# Patient Record
Sex: MALE | Race: WHITE | URBAN - METROPOLITAN AREA
[De-identification: names, ages, dates, MRNs, and addresses within clinical notes are randomized per-mention and may not be internally consistent; named-entity substitution may affect disease eponyms.]

---

## 2018-01-01 ENCOUNTER — OFFICE VISIT (OUTPATIENT)
Dept: FAMILY MEDICINE | Facility: CLINIC | Age: 83
End: 2018-01-01
Payer: COMMERCIAL

## 2018-01-01 ENCOUNTER — NURSING HOME VISIT (OUTPATIENT)
Dept: GERIATRICS | Facility: CLINIC | Age: 83
End: 2018-01-01
Payer: COMMERCIAL

## 2018-01-01 ENCOUNTER — NURSING HOME VISIT (OUTPATIENT)
Dept: GERIATRICS | Facility: CLINIC | Age: 83
End: 2018-01-01
Payer: MEDICARE

## 2018-01-01 ENCOUNTER — DISCHARGE SUMMARY NURSING HOME (OUTPATIENT)
Dept: GERIATRICS | Facility: CLINIC | Age: 83
End: 2018-01-01
Payer: COMMERCIAL

## 2018-01-01 ENCOUNTER — MEDICAL CORRESPONDENCE (OUTPATIENT)
Dept: HEALTH INFORMATION MANAGEMENT | Facility: CLINIC | Age: 83
End: 2018-01-01

## 2018-01-01 ENCOUNTER — TRANSFERRED RECORDS (OUTPATIENT)
Dept: HEALTH INFORMATION MANAGEMENT | Facility: CLINIC | Age: 83
End: 2018-01-01

## 2018-01-01 VITALS
TEMPERATURE: 97.9 F | HEIGHT: 66 IN | WEIGHT: 178 LBS | HEART RATE: 76 BPM | DIASTOLIC BLOOD PRESSURE: 71 MMHG | OXYGEN SATURATION: 90 % | SYSTOLIC BLOOD PRESSURE: 128 MMHG | RESPIRATION RATE: 15 BRPM | BODY MASS INDEX: 28.61 KG/M2

## 2018-01-01 VITALS
RESPIRATION RATE: 18 BRPM | WEIGHT: 185.7 LBS | OXYGEN SATURATION: 95 % | TEMPERATURE: 98.5 F | SYSTOLIC BLOOD PRESSURE: 126 MMHG | BODY MASS INDEX: 30.9 KG/M2 | DIASTOLIC BLOOD PRESSURE: 76 MMHG | HEART RATE: 66 BPM

## 2018-01-01 VITALS
RESPIRATION RATE: 18 BRPM | OXYGEN SATURATION: 92 % | DIASTOLIC BLOOD PRESSURE: 60 MMHG | HEIGHT: 67 IN | TEMPERATURE: 98.4 F | SYSTOLIC BLOOD PRESSURE: 100 MMHG | BODY MASS INDEX: 28.88 KG/M2 | HEART RATE: 78 BPM | WEIGHT: 184 LBS

## 2018-01-01 VITALS
WEIGHT: 198 LBS | DIASTOLIC BLOOD PRESSURE: 92 MMHG | HEIGHT: 65 IN | OXYGEN SATURATION: 96 % | RESPIRATION RATE: 20 BRPM | BODY MASS INDEX: 32.99 KG/M2 | SYSTOLIC BLOOD PRESSURE: 158 MMHG | HEART RATE: 64 BPM | TEMPERATURE: 97.5 F

## 2018-01-01 VITALS
DIASTOLIC BLOOD PRESSURE: 62 MMHG | OXYGEN SATURATION: 95 % | WEIGHT: 197 LBS | SYSTOLIC BLOOD PRESSURE: 120 MMHG | TEMPERATURE: 97.8 F | HEIGHT: 65 IN | HEART RATE: 73 BPM | RESPIRATION RATE: 20 BRPM | BODY MASS INDEX: 32.82 KG/M2

## 2018-01-01 VITALS
DIASTOLIC BLOOD PRESSURE: 66 MMHG | TEMPERATURE: 98.4 F | SYSTOLIC BLOOD PRESSURE: 115 MMHG | BODY MASS INDEX: 30.74 KG/M2 | RESPIRATION RATE: 18 BRPM | HEART RATE: 74 BPM | WEIGHT: 184.7 LBS | OXYGEN SATURATION: 90 %

## 2018-01-01 DIAGNOSIS — K59.03 DRUG-INDUCED CONSTIPATION: ICD-10-CM

## 2018-01-01 DIAGNOSIS — I10 ESSENTIAL HYPERTENSION: ICD-10-CM

## 2018-01-01 DIAGNOSIS — Z51.5 HOSPICE CARE PATIENT: ICD-10-CM

## 2018-01-01 DIAGNOSIS — C78.7 SECONDARY MALIGNANT NEOPLASM OF LIVER (H): ICD-10-CM

## 2018-01-01 DIAGNOSIS — C34.90 PRIMARY MALIGNANT NEOPLASM OF LUNG METASTATIC TO OTHER SITE, UNSPECIFIED LATERALITY (H): Primary | ICD-10-CM

## 2018-01-01 DIAGNOSIS — R25.1 TREMOR: ICD-10-CM

## 2018-01-01 DIAGNOSIS — Z23 NEED FOR PNEUMOCOCCAL VACCINATION: ICD-10-CM

## 2018-01-01 DIAGNOSIS — I25.10 CORONARY ARTERY DISEASE INVOLVING NATIVE CORONARY ARTERY OF NATIVE HEART WITHOUT ANGINA PECTORIS: ICD-10-CM

## 2018-01-01 DIAGNOSIS — R05.9 COUGH: ICD-10-CM

## 2018-01-01 DIAGNOSIS — G47.00 INSOMNIA, UNSPECIFIED TYPE: ICD-10-CM

## 2018-01-01 DIAGNOSIS — R11.0 NAUSEA: ICD-10-CM

## 2018-01-01 DIAGNOSIS — I10 HYPERTENSION GOAL BP (BLOOD PRESSURE) < 140/80: ICD-10-CM

## 2018-01-01 DIAGNOSIS — E78.5 HYPERLIPIDEMIA LDL GOAL <70: ICD-10-CM

## 2018-01-01 DIAGNOSIS — C34.91 SMALL CELL CARCINOMA OF RIGHT LUNG (H): Primary | ICD-10-CM

## 2018-01-01 DIAGNOSIS — I10 HYPERTENSION GOAL BP (BLOOD PRESSURE) < 140/80: Primary | ICD-10-CM

## 2018-01-01 DIAGNOSIS — I25.10 CORONARY ARTERY DISEASE INVOLVING NATIVE CORONARY ARTERY OF NATIVE HEART WITHOUT ANGINA PECTORIS: Primary | ICD-10-CM

## 2018-01-01 DIAGNOSIS — H91.93 BILATERAL HEARING LOSS, UNSPECIFIED HEARING LOSS TYPE: ICD-10-CM

## 2018-01-01 LAB
ALBUMIN SERPL-MCNC: 3.8 G/DL (ref 3.4–5)
ALP SERPL-CCNC: 81 U/L (ref 40–150)
ALT SERPL W P-5'-P-CCNC: 34 U/L (ref 0–70)
ALT SERPL-CCNC: 28 IU/L (ref 8–45)
ANION GAP SERPL CALCULATED.3IONS-SCNC: 7 MMOL/L (ref 3–14)
AST SERPL W P-5'-P-CCNC: 31 U/L (ref 0–45)
AST SERPL-CCNC: 29 IU/L (ref 2–40)
BILIRUB SERPL-MCNC: 0.8 MG/DL (ref 0.2–1.3)
BUN SERPL-MCNC: 22 MG/DL (ref 7–30)
CALCIUM SERPL-MCNC: 9.3 MG/DL (ref 8.5–10.1)
CHLORIDE SERPL-SCNC: 104 MMOL/L (ref 94–109)
CO2 SERPL-SCNC: 28 MMOL/L (ref 20–32)
CREAT SERPL-MCNC: 0.82 MG/DL (ref 0.72–1.25)
CREAT SERPL-MCNC: 0.82 MG/DL (ref 0.72–1.25)
CREAT SERPL-MCNC: 0.92 MG/DL (ref 0.66–1.25)
GFR SERPL CREATININE-BSD FRML MDRD: 79 ML/MIN/1.7M2
GFR SERPL CREATININE-BSD FRML MDRD: >60 ML/MIN/1.73M2
GFR SERPL CREATININE-BSD FRML MDRD: >60 ML/MIN/1.73M2
GLUCOSE SERPL-MCNC: 92 MG/DL (ref 65–100)
GLUCOSE SERPL-MCNC: 93 MG/DL (ref 70–99)
GLUCOSE SERPL-MCNC: 94 MG/DL (ref 65–100)
INR PPP: 1.2
LDLC SERPL DIRECT ASSAY-MCNC: 51 MG/DL
POTASSIUM SERPL-SCNC: 4.1 MMOL/L (ref 3.5–5)
POTASSIUM SERPL-SCNC: 4.2 MMOL/L (ref 3.4–5.3)
POTASSIUM SERPL-SCNC: 4.2 MMOL/L (ref 3.5–5)
PROT SERPL-MCNC: 7.6 G/DL (ref 6.8–8.8)
SODIUM SERPL-SCNC: 139 MMOL/L (ref 133–144)

## 2018-01-01 PROCEDURE — 83721 ASSAY OF BLOOD LIPOPROTEIN: CPT | Performed by: INTERNAL MEDICINE

## 2018-01-01 PROCEDURE — 90670 PCV13 VACCINE IM: CPT | Performed by: INTERNAL MEDICINE

## 2018-01-01 PROCEDURE — 99316 NF DSCHRG MGMT 30 MIN+: CPT | Performed by: NURSE PRACTITIONER

## 2018-01-01 PROCEDURE — 99309 SBSQ NF CARE MODERATE MDM 30: CPT | Mod: GW | Performed by: NURSE PRACTITIONER

## 2018-01-01 PROCEDURE — 99213 OFFICE O/P EST LOW 20 MIN: CPT | Performed by: INTERNAL MEDICINE

## 2018-01-01 PROCEDURE — 80053 COMPREHEN METABOLIC PANEL: CPT | Performed by: INTERNAL MEDICINE

## 2018-01-01 PROCEDURE — G0009 ADMIN PNEUMOCOCCAL VACCINE: HCPCS | Performed by: INTERNAL MEDICINE

## 2018-01-01 PROCEDURE — 99306 1ST NF CARE HIGH MDM 50: CPT | Performed by: INTERNAL MEDICINE

## 2018-01-01 PROCEDURE — 99214 OFFICE O/P EST MOD 30 MIN: CPT | Performed by: INTERNAL MEDICINE

## 2018-01-01 PROCEDURE — 36415 COLL VENOUS BLD VENIPUNCTURE: CPT | Performed by: INTERNAL MEDICINE

## 2018-01-01 PROCEDURE — 99310 SBSQ NF CARE HIGH MDM 45: CPT | Performed by: NURSE PRACTITIONER

## 2018-01-01 RX ORDER — AMLODIPINE AND BENAZEPRIL HYDROCHLORIDE 5; 10 MG/1; MG/1
1 CAPSULE ORAL DAILY
Qty: 90 CAPSULE | Refills: 3 | Status: SHIPPED | OUTPATIENT
Start: 2018-01-01 | End: 2018-01-01

## 2018-01-01 RX ORDER — ALBUTEROL SULFATE 0.83 MG/ML
2.5 SOLUTION RESPIRATORY (INHALATION) 3 TIMES DAILY
COMMUNITY

## 2018-01-01 RX ORDER — CALCIUM CARBONATE 500 MG/1
2 TABLET, CHEWABLE ORAL EVERY 6 HOURS PRN
COMMUNITY

## 2018-01-01 RX ORDER — ATORVASTATIN CALCIUM 40 MG/1
40 TABLET, FILM COATED ORAL DAILY
Qty: 90 TABLET | Refills: 3 | Status: SHIPPED | OUTPATIENT
Start: 2018-01-01 | End: 2018-01-01 | Stop reason: ALTCHOICE

## 2018-01-01 RX ORDER — LANOLIN ALCOHOL/MO/W.PET/CERES
3 CREAM (GRAM) TOPICAL AT BEDTIME
Start: 2018-01-01

## 2018-01-01 RX ORDER — BISACODYL 10 MG
10 SUPPOSITORY, RECTAL RECTAL DAILY PRN
COMMUNITY

## 2018-01-01 RX ORDER — POLYETHYLENE GLYCOL 3350 17 G/17G
17 POWDER, FOR SOLUTION ORAL DAILY
COMMUNITY

## 2018-01-01 RX ORDER — AMOXICILLIN 250 MG
2 CAPSULE ORAL DAILY
COMMUNITY
End: 2018-01-01

## 2018-01-26 PROBLEM — R47.1 DYSARTHRIA: Status: ACTIVE | Noted: 2018-01-01

## 2018-01-29 PROBLEM — I25.10 CORONARY ARTERY DISEASE INVOLVING NATIVE CORONARY ARTERY OF NATIVE HEART WITHOUT ANGINA PECTORIS: Status: ACTIVE | Noted: 2018-01-01

## 2018-01-29 PROBLEM — R25.1 TREMOR: Status: ACTIVE | Noted: 2018-01-01

## 2018-01-29 NOTE — PATIENT INSTRUCTIONS
You should start taking atorvastatin 40 mg per day, and amlodipine/benazepril per day.                 These medications will cut down your risk of stroke and heart attack.                          Please follow up in 3 months.                   Keep taking the aspirin 81 mg per day.

## 2018-01-29 NOTE — PROGRESS NOTES
"  SUBJECTIVE:   Julito Cooper is a 82 year old male who presents to clinic today for the following health issues:      ED/UC Followup:    Facility:  [facility]  Date of visit: 1/22/2018  Reason for visit: speech disturbance  Current Status: stable, still having trouble, and also discuss \"tremors\" recently?       This is from the recent [facility] ER visit:     \"The patient reports that he has been having trouble talking/slurred speech for the past 3 days, where he feels like there something \"sandpaper-like\" in the back of his throat and is making him have trouble forming words. Patient found by EMS to be hypertensive. He states that he is not having trouble breathing and that he does not have a headache.\"                                                                                                                                                                            MR MRA Head and Neck Stroke INC brain wwo:  Unremarkable MRA of the head as far as visualized.  Please see the radiology dictation for the complete report    XR chest 2 views PA and Lateral:  No significant infiltrate or pulmonary edema is identified.  Please see the radiology dictation for the complete report    ECG (reviewed and interpreted):  Performed at 1508  Rate: 57 bpm  Sinus bradycardia. Right bundle branch block. Left anterior fascicular block. Bifascicular block, Abnormal ECG. Compared to ECG of 5/30/15  Dr. Garnett reviewed the patient's EKG, with comments made as listed above.  Please see scanned ECG for full report.       LABORATORY: (reviewed and interpreted)    CBC WITH WBC DIFFERENTIAL:  CBC WBC Differential   Recent Labs   01/22/18  1504   WBC 6.2   HGB 14.3   HCT 43.4   MCV 93   MCH 30.8   MCHC 32.9   RDW 13.1      Recent Labs   01/22/18  1504   NEUTROPHILS 68.0   LYMPHS 23.1   MONOS 7.3   EOSINOS 1.1   BASOS 0.3       Creatinine:  Recent Labs   01/22/18  1513   CREATINISTAT 0.90     Recent Labs   01/22/18  1504   CREATININE 0.82 " "      BASIC METABOLIC PANEL:  Recent Labs   01/22/18  1504   SODIUM 137   POTASSIUM 4.1   CHLORIDE 103   YQ7LKHCX 24   ANIONGAP 10   BUN 16   CREATININE 0.82   GLUCOSE 92   CALCIUM 9.1     GFR:  Recent Labs   01/22/18  1504   GFRAFRICAN >60   GFRNOTAFRICA >60                         He states his voice is improving.     He wonders if his voice quality was different because of \"a problem with his hearing aids\".  He has chronic severe hearing loss.                    He also notes a mild L hand tremor,recent onset.             He states his brother had a similar tremor.                                              Lives alone now; his wife is in a NH.           He has not been here as a patient for several years.  He did bring in his wife on a regular basis, until she went in a nursing home sometime last year.                           The only med he takes is ASA.          Somehow he stopped taking his atorvastatin, Plavix, and metoprolol.                    He states he is not having any chest symptoms.  He shovels his snow.  He drives.      Patient Active Problem List    Diagnosis Date Noted     Dysarthria 01/26/2018     Priority: High     And tremor; see ER visit 1/18; MRI brain neg; labs ok; BP high       CAD (coronary artery disease) 03/30/2015     Priority: High     ACS (acute coronary syndrome) (H) 03/29/2015     Priority: High     In 3/15; received 4 DAVID ; see the d/c summary; rx Plavix for at least one yr; ongoing ASA and lipitor 40 mg       Osteoporosis      Priority: High     T-12 compression fx; low vit D level; T -2.1 spine, -2.1 L fem neck, - 2.4 R in 1/13; add alendronate 2/13; vit D up to 24 in 2/13; add another 1,000 IU vit D.       Took alendronate for unknown length of time; stopped on his own.           Compression fracture of T12 vertebra (H) 12/31/2012     Priority: High     See [facility] records 12/12; slight compression; plus an old L-1 compression; vit D 14       Hearing loss 12/26/2012     " Priority: High     Problem list name updated by automated process. Provider to review       Hypertension goal BP (blood pressure) < 140/80 12/26/2012     Priority: High     Esophageal reflux 12/26/2012     Priority: Medium     Insomnia 12/26/2012     Priority: Medium     Problem list name updated by automated process. Provider to review       Primary localized osteoarthrosis, lower leg 12/26/2012     Priority: Medium     Advanced directives, counseling/discussion 02/11/2014     Priority: Low     Advance Care Planning:   ACP Review and Resources Provided:  Reviewed chart for advance care plan.  Julito Cooper has no plan or code status on file. Discussed available resources and provided with information.on 11-22-13 by Geraldine Nugent RN/. Added by Roopa Jung on 2/11/2014             Preventive measure 09/04/2013     Priority: Low     [facility]12 NOTE  Colonoscopy 3/09,neg         Past Surgical History:   Procedure Laterality Date     CHOLECYSTECTOMY  7/2010     HERNIA REPAIR  1990's     HERNIA REPAIR Left 8/14    incarcerated L inguinal; Dr. Robles     ORTHOPEDIC SURGERY  2007    Right TKA     TURP  2002     Social History     Social History     Marital status:      Spouse name: N/A     Number of children: N/A     Years of education: N/A     Occupational History     Not on file.     Social History Main Topics     Smoking status: Former Smoker     Types: Pipe     Smokeless tobacco: Never Used      Comment: Quit smoking a pipe 3 months ago.      Alcohol use No     Drug use: No     Sexual activity: No     Other Topics Concern     Not on file     Social History Narrative     Immunization History   Administered Date(s) Administered     Influenza (High Dose) 3 valent vaccine 11/01/2013, 11/01/2014, 09/14/2016, 09/18/2017     Influenza (IIV3) PF 09/10/2015     Pneumococcal, Unspecified 07/01/2010     TDAP Vaccine (Adacel) 02/02/2011       Current Outpatient Prescriptions  "  Medication Sig Dispense Refill                    aspirin 81 MG tablet Take 1 tablet (81 mg) by mouth daily  90 tablet 6             No Known Allergies  BP Readings from Last 3 Encounters:   01/29/18 (!) 158/92   03/30/15 110/54   02/23/15 114/62    Wt Readings from Last 3 Encounters:   01/29/18 198 lb (89.8 kg)   03/30/15 192 lb 12.8 oz (87.5 kg)   02/23/15 185 lb (83.9 kg)                    Reviewed and updated as needed this visit by clinical staff  Tobacco  Allergies  Meds  Med Hx  Surg Hx  Fam Hx  Soc Hx      Reviewed and updated as needed this visit by Provider         ROS:  CONSTITUTIONAL:NEGATIVE for fever, chills, change in weight  RESP:NEGATIVE for significant cough or SOB  CV: NEGATIVE for chest pain, palpitations or peripheral edema  NEURO: NEGATIVE for dysphagia and gait disturbance    OBJECTIVE:                                                    BP (!) 158/92 (BP Location: Left arm, Patient Position: Chair, Cuff Size: Adult Large)  Pulse 64  Temp 97.5  F (36.4  C)  Resp 20  Ht 5' 5\" (1.651 m)  Wt 198 lb (89.8 kg)  SpO2 96%  BMI 32.95 kg/m2  Body mass index is 32.95 kg/(m^2).  GENERAL APPEARANCE: alert, no distress and his overall appearance is unchanged.  RESP: no rales or rhonchi  CV: regular rates and rhythm, normal S1 S2, no S3 or S4 and no murmur, click or rub  NEURO: His speech quality seems no different than in the past.              There is very slight tremor left hand.                  Diagnostic test results:  none      ASSESSMENT/PLAN:                                                        ICD-10-CM    1. Coronary artery disease involving native coronary artery of native heart without angina pectoris I25.10 atorvastatin (LIPITOR) 40 MG tablet     amLODIPine-benazepril (LOTREL) 5-10 MG per capsule   2. Hypertension goal BP (blood pressure) < 140/80 I10 amLODIPine-benazepril (LOTREL) 5-10 MG per capsule   3. Hyperlipidemia LDL goal <70 E78.5    4. Tremor R25.1    5. Bilateral " hearing loss, unspecified hearing loss type H91.93        Summary and implications:       We reviewed his situation at length.               He agrees to resume taking the atorvastatin.                                       He needs to resume antihypertensive medication.                 We will avoid using a beta-blocker, in the emergency room, his electrocardiogram showed bifascicular block with sinus bradycardia.                                               He plans on seeing neurology.  He was given a referral to the [facility] clinic from the emergency room.  Patient Instructions   You should start taking atorvastatin 40 mg per day, and amlodipine/benazepril per day.                 These medications will cut down your risk of stroke and heart attack.                          Please follow up in 3 months.                   Keep taking the aspirin 81 mg per day.                             Arturo Yeboah MD  [facility]

## 2018-01-29 NOTE — PROGRESS NOTES
"  SUBJECTIVE:   Julito Cooper is a 82 year old male who presents to clinic today for the following health issues:      Hyperlipidemia Follow-Up      Rate your low fat/cholesterol diet?: { :821033::\"good\"}    Taking statin?  { :142838::\"No\"}    Other lipid medications/supplements?:  { :932631::\"none\"}    Hypertension Follow-up      Outpatient blood pressures {ISCHECKIN}    Low Salt Diet: { :232346::\"no added salt\"}      Amount of exercise or physical activity: {Exercise frequency days per week:461785}    Problems taking medications regularly: {Med Problems:007795::\"No\"}    Medication side effects: {CHRONIC MED SIDE EFFECTS:899045::\"none\"}    Diet: { :952023}        {additional problems for provider to add:959274}    Problem list and histories reviewed & adjusted, as indicated.  Additional history: {NONE - AS DOCUMENTED:272112::\"as documented\"}    {HIST REVIEW/ LINKS 2:346708}    Reviewed and updated as needed this visit by clinical staff  Tobacco  Allergies  Med Hx  Surg Hx  Fam Hx  Soc Hx      Reviewed and updated as needed this visit by Provider         {PROVIDER CHARTING PREFERENCE:612315}  "

## 2018-01-29 NOTE — MR AVS SNAPSHOT
After Visit Summary   1/29/2018    Julito Cooper    MRN: [id number]           Patient Information     Date Of Birth          1935        Visit Information        Provider Department      1/29/2018 11:30 AM Arturo Yeboah MD [facility]        Today's Diagnoses     Coronary artery disease involving native coronary artery of native heart without angina pectoris    -  1    Hypertension goal BP (blood pressure) < 140/80        Hyperlipidemia LDL goal <70        Bilateral hearing loss, unspecified hearing loss type          Care Instructions    You should start taking atorvastatin 40 mg per day, and amlodipine/benazepril per day.                 These medications will cut down your risk of stroke and heart attack.                          Please follow up in 3 months.                   Keep taking the aspirin 81 mg per day.                                 Follow-ups after your visit        Who to contact     If you have questions or need follow up information about today's clinic visit or your schedule please contact [facility] directly at [phone].  Normal or non-critical lab and imaging results will be communicated to you by MyChart, letter or phone within 4 business days after the clinic has received the results. If you do not hear from us within 7 days, please contact the clinic through [employer] or phone. If you have a critical or abnormal lab result, we will notify you by phone as soon as possible.  Submit refill requests through [employer] or call your pharmacy and they will forward the refill request to us. Please allow 3 business days for your refill to be completed.          Additional Information About Your Visit        MyChart Information     [employer] lets you send messages to your doctor, view your test results, renew your prescriptions, schedule appointments and more. To sign up, go to [email or web address] .  "Click on \"Log in\" on the left side of the screen, which will take you to the Welcome page. Then click on \"Sign up Now\" on the right side of the page.     You will be asked to enter the access code listed below, as well as some personal information. Please follow the directions to create your username and password.     Your access code is: [email or web address]  Expires: 2018 11:48 AM     Your access code will  in 90 days. If you need help or a new code, please call your [facility] clinic or [phone].        Care EveryWhere ID     This is your Care EveryWhere ID. This could be used by other organizations to access your [facility] medical records  [id number]        Your Vitals Were     Pulse Temperature Respirations Height Pulse Oximetry BMI (Body Mass Index)    64 97.5  F (36.4  C) 20 5' 5\" (1.651 m) 96% 32.95 kg/m2       Blood Pressure from Last 3 Encounters:   18 (!) 158/92   03/30/15 110/54   02/23/15 114/62    Weight from Last 3 Encounters:   18 198 lb (89.8 kg)   03/30/15 192 lb 12.8 oz (87.5 kg)   02/23/15 185 lb (83.9 kg)              Today, you had the following     No orders found for display         Today's Medication Changes          These changes are accurate as of 18 11:48 AM.  If you have any questions, ask your nurse or doctor.               Start taking these medicines.        Dose/Directions    amLODIPine-benazepril 5-10 MG per capsule   Commonly known as:  LOTREL   Used for:  Hypertension goal BP (blood pressure) < 140/80, Coronary artery disease involving native coronary artery of native heart without angina pectoris   Started by:  Arturo Yeboah MD        Dose:  1 capsule   Take 1 capsule by mouth daily   Quantity:  90 capsule   Refills:  3         Stop taking these medicines if you haven't already. Please contact your care team if you have questions.     cholecalciferol 1000 UNIT tablet   Commonly known as:  vitamin D3   Stopped by:  Arturo Yeboah MD           clopidogrel " 75 MG tablet   Commonly known as:  PLAVIX   Stopped by:  Arturo Yeboah MD           metoprolol succinate 25 MG 24 hr tablet   Commonly known as:  TOPROL-XL   Stopped by:  Arturo Yeboah MD           SENOKOT S 8.6-50 MG per tablet   Generic drug:  senna-docusate   Stopped by:  Arturo Yeboah MD           TUMS PO   Stopped by:  Arturo Yeboah MD                Where to get your medicines      These medications were sent to [place] [address] [place] [address]  [address] [facility] [address]     Phone:  [phone]     amLODIPine-benazepril 5-10 MG per capsule    atorvastatin 40 MG tablet                Primary Care Provider Office Phone # Fax #    Arturo Yeboah [phone] [address] [facility] [address]        Equal Access to Services     [facility]: Hadii aad ku hadasho Soomaali, waaxda luqadaha, qaybta kaalmada adeegyada, waxay jhonnyin hayaan adelatosha sewell . So [facility] [phone].    ATENCIÓN: Si habla español, tiene a steele disposición servicios gratuitos de asistencia lingüística. Llame al [phone].    We comply with applicable federal civil rights laws and [place] laws. We do not discriminate on the basis of race, color, national origin, age, disability, sex, sexual orientation, or gender identity.            Thank you!     Thank you for choosing [facility]  for your care. Our goal is always to provide you with excellent care. Hearing back from our patients is one way we can continue to improve our services. Please take a few minutes to complete the written survey that you may receive in the mail after your visit with us. Thank you!             Your Updated Medication List - Protect others around you: Learn how to safely use, store and throw away your medicines at www.disposemymeds.org.          This list is accurate as of 1/29/18 11:48 AM.  Always use your most recent med list.                    Brand Name Dispense Instructions for use Diagnosis    amLODIPine-benazepril 5-10 MG per capsule    LOTREL    90 capsule    Take 1 capsule by mouth daily    Hypertension goal BP (blood pressure) < 140/80, Coronary artery disease involving native coronary artery of native heart without angina pectoris       aspirin 81 MG tablet     90 tablet    Take 1 tablet (81 mg) by mouth daily    CAD (coronary artery disease)       atorvastatin 40 MG tablet    LIPITOR    90 tablet    Take 1 tablet (40 mg) by mouth daily    Coronary artery disease involving native coronary artery of native heart without angina pectoris       NITROSTAT 0.4 MG sublingual tablet   Generic drug:  nitroGLYcerin

## 2018-01-29 NOTE — NURSING NOTE
"Chief Complaint   Patient presents with     Hospital F/U       Initial BP (!) 158/92 (BP Location: Left arm, Patient Position: Chair, Cuff Size: Adult Large)  Pulse 64  Temp 97.5  F (36.4  C)  Resp 20  Ht 5' 5\" (1.651 m)  Wt 198 lb (89.8 kg)  SpO2 96%  BMI 32.95 kg/m2 Estimated body mass index is 32.95 kg/(m^2) as calculated from the following:    Height as of this encounter: 5' 5\" (1.651 m).    Weight as of this encounter: 198 lb (89.8 kg).  Medication Reconciliation: complete   Macrina Kaminski LPN  "

## 2018-02-28 PROBLEM — E78.5 HYPERLIPIDEMIA LDL GOAL <70: Status: ACTIVE | Noted: 2018-01-01

## 2018-05-07 NOTE — NURSING NOTE
Prior to injection verified patient identity using patient's name and date of birth.  Due to injection administration, patient instructed to remain in clinic for 15 minutes  afterwards, and to report any adverse reaction to me immediately.  Screening Questionnaire for Adult Immunization    Are you sick today?   No   Do you have allergies to medications, food, a vaccine component or latex?   No   Have you ever had a serious reaction after receiving a vaccination?   No   Do you have a long-term health problem with heart disease, lung disease, asthma, kidney disease, metabolic disease (e.g. diabetes), anemia, or other blood disorder?   No   Do you have cancer, leukemia, HIV/AIDS, or any other immune system problem?   No   In the past 3 months, have you taken medications that affect  your immune system, such as prednisone, other steroids, or anticancer drugs; drugs for the treatment of rheumatoid arthritis, Crohn s disease, or psoriasis; or have you had radiation treatments?   No   Have you had a seizure, or a brain or other nervous system problem?   No   During the past year, have you received a transfusion of blood or blood     products, or been given immune (gamma) globulin or antiviral drug?   No   For women: Are you pregnant or is there a chance you could become        pregnant during the next month?   No   Have you received any vaccinations in the past 4 weeks?   No     Immunization questionnaire answers were all negative.        Per orders of Dr. Yeboah, injection of Prevnar given by Macrina Kaminski. Patient instructed to remain in clinic for 15 minutes afterwards, and to report any adverse reaction to me immediately.       Screening performed by Macrina Kaminski on 5/7/2018 at 12:01 PM.

## 2018-05-07 NOTE — MR AVS SNAPSHOT
After Visit Summary   5/7/2018    Julito Cooper    MRN: [id number]           Patient Information     Date Of Birth          1935        Visit Information        Provider Department      5/7/2018 11:30 AM Arturo Yeboah MD [facility]        Today's Diagnoses     Hypertension goal BP (blood pressure) < 140/80    -  1    Hyperlipidemia LDL goal <70        Coronary artery disease involving native coronary artery of native heart without angina pectoris        Need for pneumococcal vaccination          Care Instructions    I will let you know your lab results.   Please follow up in the fall, or earlier if you are having problems.                                                        Follow-ups after your visit        Your next 10 appointments already scheduled     May 07, 2018 11:30 AM CDT   Office Visit with Arturo Yeboah MD   [facility] ([facility])    [address] [address] [phone]           Bring a current list of meds and any records pertaining to this visit. For Physicals, please bring immunization records and any forms needing to be filled out. Please arrive 10 minutes early to complete paperwork.              Who to contact     If you have questions or need follow up information about today's clinic visit or your schedule please contact [facility] directly at [phone].  Normal or non-critical lab and imaging results will be communicated to you by MyChart, letter or phone within 4 business days after the clinic has received the results. If you do not hear from us within 7 days, please contact the clinic through MyChart or phone. If you have a critical or abnormal lab result, we will notify you by phone as soon as possible.  Submit refill requests through BYOM! or call your pharmacy and they will forward  "the refill request to us. Please allow 3 business days for your refill to be completed.          Additional Information About Your Visit        MyChart Information     Gucash lets you send messages to your doctor, view your test results, renew your prescriptions, schedule appointments and more. To sign up, go to [email or web address] . Click on \"Log in\" on the left side of the screen, which will take you to the Welcome page. Then click on \"Sign up Now\" on the right side of the page.     You will be asked to enter the access code listed below, as well as some personal information. Please follow the directions to create your username and password.     Your access code is: [email or web address]  Expires: 2018 11:21 AM     Your access code will  in 90 days. If you need help or a new code, please call your [place] clinic or [phone].        Care EveryWhere ID     This is your Care EveryWhere ID. This could be used by other organizations to access your [facility] medical records  [id number]        Your Vitals Were     Pulse Temperature Respirations Height Pulse Oximetry BMI (Body Mass Index)    73 97.8  F (36.6  C) 20 5' 5\" (1.651 m) 95% 32.78 kg/m2       Blood Pressure from Last 3 Encounters:   18 120/62   18 (!) 158/92   03/30/15 110/54    Weight from Last 3 Encounters:   18 197 lb (89.4 kg)   18 198 lb (89.8 kg)   03/30/15 192 lb 12.8 oz (87.5 kg)              We Performed the Following     Comprehensive metabolic panel (BMP + Alb, Alk Phos, ALT, AST, Total. Bili, TP)     LDL cholesterol direct        Primary Care Provider Office Phone # Fax #    Arturo Yeboah [phone] [address] [facility] [address]        Equal Access to Services     DESTINEE MAYERS AH: Eli Jones, juno herman, kerry carbajalsh la'aan ah. [facility] [phone].    ATENCIÓN: Si habla español, tiene a steele disposición " servicios gratuitos de asistencia lingüística. Adeola durán [phone].    We comply with applicable federal civil rights laws and [place] laws. We do not discriminate on the basis of race, color, national origin, age, disability, sex, sexual orientation, or gender identity.            Thank you!     Thank you for choosing [facility]  for your care. Our goal is always to provide you with excellent care. Hearing back from our patients is one way we can continue to improve our services. Please take a few minutes to complete the written survey that you may receive in the mail after your visit with us. Thank you!             Your Updated Medication List - Protect others around you: Learn how to safely use, store and throw away your medicines at www.disposemymeds.org.          This list is accurate as of 5/7/18 11:21 AM.  Always use your most recent med list.                   Brand Name Dispense Instructions for use Diagnosis    amLODIPine-benazepril 5-10 MG per capsule    LOTREL    90 capsule    Take 1 capsule by mouth daily    Hypertension goal BP (blood pressure) < 140/80, Coronary artery disease involving native coronary artery of native heart without angina pectoris       aspirin 81 MG tablet     90 tablet    Take 1 tablet (81 mg) by mouth daily    CAD (coronary artery disease)       atorvastatin 40 MG tablet    LIPITOR    90 tablet    Take 1 tablet (40 mg) by mouth daily    Coronary artery disease involving native coronary artery of native heart without angina pectoris

## 2018-05-07 NOTE — PROGRESS NOTES
"  SUBJECTIVE:   Julito Cooper is a 83 year old male who presents to clinic today for the following health issues:      Hyperlipidemia Follow-Up      Rate your low fat/cholesterol diet?: good    Taking statin?  Yes, no muscle aches from statin    Other lipid medications/supplements?:  none    Hypertension Follow-up      Outpatient blood pressures are not being checked.    Low Salt Diet: no added salt      Amount of exercise or physical activity: None    Problems taking medications regularly: No    Medication side effects: none    Diet: low salt and low fat/cholesterol                        He states he is taking his medications.              No cardiopulmonary symptoms.  Problem list and histories reviewed & adjusted, as indicated.        Current Outpatient Prescriptions   Medication Sig Dispense Refill     amLODIPine-benazepril (LOTREL) 5-10 MG per capsule Take 1 capsule by mouth daily 90 capsule 3     aspirin 81 MG tablet Take 1 tablet (81 mg) by mouth daily 90 tablet 6     atorvastatin (LIPITOR) 40 MG tablet Take 1 tablet (40 mg) by mouth daily 90 tablet 3     No Known Allergies  BP Readings from Last 3 Encounters:   05/07/18 120/62   01/29/18 (!) 158/92   03/30/15 110/54    Wt Readings from Last 3 Encounters:   05/07/18 197 lb (89.4 kg)   01/29/18 198 lb (89.8 kg)   03/30/15 192 lb 12.8 oz (87.5 kg)                    Reviewed and updated as needed this visit by clinical staff       Reviewed and updated as needed this visit by Provider         ROS:  CONSTITUTIONAL:NEGATIVE for fever, chills, change in weight  RESP:NEGATIVE for significant cough or SOB  CV: NEGATIVE for chest pain, palpitations or peripheral edema    OBJECTIVE:                                                    /62 (BP Location: Left arm, Patient Position: Chair, Cuff Size: Adult Large)  Pulse 73  Temp 97.8  F (36.6  C)  Resp 20  Ht 5' 5\" (1.651 m)  Wt 197 lb (89.4 kg)  SpO2 95%  BMI 32.78 kg/m2  Body mass index is 32.78 " kg/(m^2).  GENERAL APPEARANCE: alert and no distress  RESP: lungs clear to auscultation - no rales, rhonchi or wheezes  CV: regular rates and rhythm, normal S1 S2, no S3 or S4 and no murmur, click or rub    Diagnostic test results:  Pending      ASSESSMENT/PLAN:                                                        ICD-10-CM    1. Hypertension goal BP (blood pressure) < 140/80 I10 Comprehensive metabolic panel (BMP + Alb, Alk Phos, ALT, AST, Total. Bili, TP)   2. Hyperlipidemia LDL goal <70 E78.5 Comprehensive metabolic panel (BMP + Alb, Alk Phos, ALT, AST, Total. Bili, TP)     LDL cholesterol direct   3. Coronary artery disease involving native coronary artery of native heart without angina pectoris I25.10    4. Need for pneumococcal vaccination Z23        Check labs and adjust medications as indicated.  Nonfasting today.  Patient Instructions   I will let you know your lab results.   Please follow up in the fall, or earlier if you are having problems.                                                    Arturo Yeboah MD  [facility]   Results for orders placed or performed in visit on 05/07/18   Comprehensive metabolic panel (BMP + Alb, Alk Phos, ALT, AST, Total. Bili, TP)   Result Value Ref Range    Sodium 139 133 - 144 mmol/L    Potassium 4.2 3.4 - 5.3 mmol/L    Chloride 104 94 - 109 mmol/L    Carbon Dioxide 28 20 - 32 mmol/L    Anion Gap 7 3 - 14 mmol/L    Glucose 93 70 - 99 mg/dL    Urea Nitrogen 22 7 - 30 mg/dL    Creatinine 0.92 0.66 - 1.25 mg/dL    GFR Estimate 79 >60 mL/min/1.7m2    GFR Estimate If Black >90 >60 mL/min/1.7m2    Calcium 9.3 8.5 - 10.1 mg/dL    Bilirubin Total 0.8 0.2 - 1.3 mg/dL    Albumin 3.8 3.4 - 5.0 g/dL    Protein Total 7.6 6.8 - 8.8 g/dL    Alkaline Phosphatase 81 40 - 150 U/L    ALT 34 0 - 70 U/L    AST 31 0 - 45 U/L   LDL cholesterol direct   Result Value Ref Range    LDL Cholesterol Direct 51 <100 mg/dL     Letter sent.                  Your lab  results are good.    Keep taking the same medications.

## 2018-05-07 NOTE — LETTER
May 8, 2018      Julito Cooper  [address] [facility] [address]        Dear ,    We are writing to inform you of your test results.             Your lab results are good.    Keep taking the same medications.       Resulted Orders   Comprehensive metabolic panel (BMP + Alb, Alk Phos, ALT, AST, Total. Bili, TP)   Result Value Ref Range    Sodium 139 133 - 144 mmol/L    Potassium 4.2 3.4 - 5.3 mmol/L    Chloride 104 94 - 109 mmol/L    Carbon Dioxide 28 20 - 32 mmol/L    Anion Gap 7 3 - 14 mmol/L    Glucose 93 70 - 99 mg/dL      Comment:      Non Fasting    Urea Nitrogen 22 7 - 30 mg/dL    Creatinine 0.92 0.66 - 1.25 mg/dL    GFR Estimate 79 >60 mL/min/1.7m2      Comment:      Non  GFR Calc    GFR Estimate If Black >90 >60 mL/min/1.7m2      Comment:       GFR Calc    Calcium 9.3 8.5 - 10.1 mg/dL    Bilirubin Total 0.8 0.2 - 1.3 mg/dL    Albumin 3.8 3.4 - 5.0 g/dL    Protein Total 7.6 6.8 - 8.8 g/dL    Alkaline Phosphatase 81 40 - 150 U/L    ALT 34 0 - 70 U/L    AST 31 0 - 45 U/L   LDL cholesterol direct   Result Value Ref Range    LDL Cholesterol Direct 51 <100 mg/dL      Comment:      Desirable:       <100 mg/dl       If you have any questions or concerns, please call the clinic at the number listed above.       Sincerely,        Arturo Yeboah MD

## 2018-05-07 NOTE — PATIENT INSTRUCTIONS
I will let you know your lab results.   Please follow up in the fall, or earlier if you are having problems.

## 2018-05-31 PROBLEM — R91.8 LUNG MASS: Status: ACTIVE | Noted: 2018-01-01

## 2018-05-31 PROBLEM — K76.9 LIVER LESION: Status: ACTIVE | Noted: 2018-01-01

## 2018-05-31 NOTE — PROGRESS NOTES
"[facility]  PRIMARY CARE PROVIDER AND CLINIC:  Arturo Yeboah [address] [facility] [address]  Chief Complaint   Patient presents with     Hospital F/U     [place] Medical Record Number:  [id number]    HPI:    Julito Cooper is a 83 year old  (1935),admitted to the [facility] from [facility] .  Hospital stay 5/23/18 through 5/26/18.  Admitted to this facility for  rehab, medical management and nursing care.      Hospital Summary per [facility] 5/25/18:    Julito Cooper is a 83 y.o. male with a history of NON-ST-ELEVATION MYOCARDIAL INFARCTION (2015) who presents with chest pain.      Patient developed chest pain after exercising on Monday where it \"worked back and forth.\" There is also radiation to the RUQ of his abdomen. His symptoms persisted to day of admission, so he called EMS and presented to the ED for evaluation.     In the ED, SpO2 89%, other vitals stable. Labs remarkable for anemia 13.2 and elevated BUN 31. Hepatic function panel, BNP, troponin, and lipase grossly normal. CXR negative for acute disease. Limited US abdomen showed echogenic liver parenchyma suggesting fatty infiltration and multiple intrahepatic mass lesions. CT chest PE showed primary lung carcinoma in the upper left lobe with abnormal mediastinal lymphadenopathy and diffuse liver metastases. EKG in NSR. Patient was admitted for further evaluation and treatment. Underwent liver biopsy 5/24.       HPI information obtained from: facility chart records, facility staff, patient report and [facility] chart review.      Current issues are:      HPI is challenging due to hearing loss and possible cognitive impairment vs developmental delay.    Primary malignant neoplasm of lung metastatic to other site, unspecified laterality (H)  Patient was supposed to see oncology 5/29/18, but apparently this appointment got rescheduled. He is not sure what the plan is. He thinks he is " "going for some kind of \"therapy\" at 1pm today. He has no appointment today, it seems he is referring possibly to physical therapy. He does say that his niece is working on being his decision maker (she has the POA paperwork and information on notary from social work). Also she is hoping to get both him and his wife (LTC here) transferred to [facility] where patient's brother (niece's father) lives in [facility].     Essential hypertension  BP readings range:  115/66  107/63  100/63  112/65  102/60    Coronary artery disease involving native coronary artery of native heart without angina pectoris  Patient had a not been to a doctor in a while until he saw his wife's previous PCP 1/2018 after a hospital stay. He had only been taking an ASA and had stopped his statin, plavix, and metoprolol at some point. At that visit beta blocker was not restarted due to bradycardia. He was started on atorvastatin and amlodipine/benazepril. He followed up with PCP 5/7/18 and has been taking his medications.    Insomnia  Patient is primarily concerned with his difficulty sleeping since arriving here. He can't sleep and he doesn't know why. Denies that pain or anxiety are an issue. He has never had trouble sleeping in the past, never taken anything for sleep.      CODE STATUS/ADVANCE DIRECTIVES DISCUSSION:   CPR/Full code   Patient's living condition: lives alone    ALLERGIES:Review of patient's allergies indicates no known allergies.  PAST MEDICAL HISTORY:  has a past medical history of Osteoporosis.  PAST SURGICAL HISTORY:  has a past surgical history that includes hernia repair (1990's); turp (2002); Cholecystectomy (7/2010); orthopedic surgery (2007); and hernia repair (Left, 8/14).  FAMILY HISTORY: family history is not on file.  SOCIAL HISTORY:  reports that he has quit smoking. His smoking use included Pipe. He has never used smokeless tobacco. He reports that he does not drink alcohol or use illicit drugs.    Post " Discharge Medication Reconciliation Status: discharge medications reconciled, continue medications without change.  Current Outpatient Prescriptions   Medication Sig Dispense Refill     Acetaminophen (TYLENOL PO) Take 650 mg by mouth every 4 hours as needed for mild pain or fever       AmLODIPine Besylate (NORVASC PO) Take 5 mg by mouth daily       aspirin 81 MG tablet Take 1 tablet (81 mg) by mouth daily 90 tablet 6     atorvastatin (LIPITOR) 40 MG tablet Take 1 tablet (40 mg) by mouth daily 90 tablet 3     Benazepril HCl (LOTENSIN PO) Take 10 mg by mouth daily         ROS:  10 point ROS of systems including Constitutional, Eyes, Respiratory, Cardiovascular, Gastroenterology, Genitourinary, Integumentary, Muscularskeletal, Psychiatric were all negative except for pertinent positives noted in my HPI.    Exam:  /66  Pulse 74  Temp 98.4  F (36.9  C)  Resp 18  Wt 184 lb 11.2 oz (83.8 kg)  SpO2 90%  BMI 30.74 kg/m2  GENERAL APPEARANCE:  Alert, in no distress, oriented  ENT:  Mouth and posterior oropharynx normal, moist mucous membranes, [place]  EYES:  EOM, conjunctivae, lids, pupils and irises normal  NECK:  No adenopathy,masses or thyromegaly  RESP:  respiratory effort and palpation of chest normal, lungs clear to auscultation , no respiratory distress  CV:  Palpation and auscultation of heart done , regular rate and rhythm, no murmur, rub, or gallop, no edema  ABDOMEN:  normal bowel sounds, soft, nontender, no hepatosplenomegaly or other masses  SKIN:  Inspection of skin and subcutaneous tissue baseline, Palpation of skin and subcutaneous tissue baseline  PSYCH:  often answers questions incorrectly, it is difficult to tell if this is due to his severe hearing loss, cognitive impairment, low education, or all of the above. He is calm, follows commands    Lab/Diagnostic data:    [facility]    Recent Labs   05/24/18  0558 05/23/18  0909   WBC 6.6 6.3   RBC 4.15 L 4.37    HGB 12.7 L 13.2 L   HCT 39.7 42.0   MCV 96 96   MCH 30.6 30.2   MCHC 32.0 31.4 L    162   MPV 10.3 10.3     Recent Labs   05/24/18  0558 05/23/18  0909   SODIUM 137 138   POTASSIUM 4.2 4.1   CHLORIDE 105 104   GL0JJKVS 25 27   BUN 22 31 H   CREATININE 0.82 0.97   CALCIUM 9.1 9.5   GLUCOSE 94 98     Recent Labs   05/23/18  0909   ALBUMIN 3.7   BILITOTAL 0.7   BILIDIRECT 0.4   ALT 28   AST 29   ALKPHOSPH 91   PROTEIN 7.0         Last Basic Metabolic Panel:  Recent Labs   Lab Test  05/07/18   1130 01/22/18 05/30/15   02/11/13   1054   NA  139   --   138   < >  142   POTASSIUM  4.2  4.1  1.0   < >  4.2   CHLORIDE  104   --   105   < >  105   LAKISHA  9.3   --   9.2   < >  8.7   CO2  28   --    --    --   28   BUN  22   --   22   < >  17   CR  0.92  0.82  0.85   < >  1.10   GLC  93  92  175*   < >  96    < > = values in this interval not displayed.       Liver Function Studies -   Recent Labs   Lab Test  05/07/18   1130  02/11/13   1054   PROTTOTAL  7.6  7.4   ALBUMIN  3.8  3.9   BILITOTAL  0.8  0.8   ALKPHOS  81  104   AST  31  18   ALT  34  22         ASSESSMENT/PLAN:  (C34.90) Primary malignant neoplasm of lung metastatic to other site, unspecified laterality (H)  (primary encounter diagnosis)  Comment: Prognosis is poor. Treatment plan is unknown at this point. Based on conversation today and his unclear understanding of the diagnosis, his niece should certainly help make the treatment decision. Hospice would likely be the best course of action.   Plan: f/u oncology    (I10) Essential hypertension  Comment: Too tightly controlled currently based on life expectancy, fall risk, weakness. Will stop amlodipine for now.  Plan: D/C amlodipine. Monitor BP    (I25.10) Coronary artery disease involving native coronary artery of native heart without angina pectoris  Comment: Asymptomatic  Plan: Cont ASA, statin, ACEI    (G47.00) Insomnia, unspecified type  Comment: Likely related to anxiety, medical issues, move away from  home. Would prefer to avoid sedatives for now  Plan: Melatonin 3mg qhs. Monitor insomnia      Orders:  D/C amlodipine  Melatonin 3mg qhs      Electronically signed by:  CAMILA Ward [facility]  Pager: [phone]

## 2018-06-07 NOTE — Clinical Note
I think it is you that will have  and Mrs. Cooper at [place]. I had Julito briefly at [facility], I only found out today that he was transferring. I have no idea which hospice is enrolling in, but this is supposedly happening once he gets to [place]

## 2018-06-07 NOTE — PROGRESS NOTES
[place] [facility] DISCHARGE SUMMARY    PATIENT'S NAME: Julito Cooper  YOB: 1935  MEDICAL RECORD NUMBER:  [id number]    PRIMARY CARE PROVIDER AND CLINIC RESPONSIBLE AFTER TRANSFER: Arturo Yeboah [address] / [facility] [address]     CODE STATUS/ADVANCE DIRECTIVES DISCUSSION:   CPR/Full code      No Known Allergies    TRANSFERRING PROVIDERS: CAMILA Ward CNP, Debora Ross MD  DATE OF SNF ADMISSION:  May / 26 / 2018  DATE OF SNF (anticipated) DISCHARGE: June / 07 / 2018  DISCHARGE DISPOSITION: [facility]    Nursing Facility: [facility]  stay 5/23/18 to 6/26/18.       DISCHARGE DIAGNOSIS:   1. Primary malignant neoplasm of lung metastatic to other site, unspecified laterality (H)    2. Essential hypertension    3. Coronary artery disease involving native coronary artery of native heart without angina pectoris    4. Insomnia, unspecified type        HPI Nursing Facility Course:  HPI information obtained from: facility chart records, facility staff and patient report. HPI is challenging with patient due to severe [place] and low education level vs developmental delay.     Patient had gone to the [facility] ED due to chest pain. He was ultimately diagnosed with lung cancer with liver mets. His niece is working on being his decision maker (she has the POA paperwork and information on notary from social work). Plan is to move both him and his wife (LTC here) to [facility] where patient's brother (niece's father) lives in LT. They will all be on the same floor.    Primary malignant neoplasm of lung metastatic to other site, unspecified laterality (H)  Oncology note not available to this provider, but plan is for him to move into [facility] and enroll in hospice once there. He is very happy to be living in the same place as his wife and brother.    Essential hypertension  Did not want to make  any changes on his way out the door (notified today of his leaving). Based on BP measurements, his medications could be decreased/stopped.  BP readings range:  126/76  118/67  101/64  114/67  115/66  107/63  100/63  112/65  102/60    Coronary artery disease involving native coronary artery of native heart without angina pectoris  Patient had a not been to a doctor in a while until he saw his wife's previous PCP 1/2018 after a hospital stay. He had only been taking an ASA and had stopped his statin, plavix, and metoprolol at some point. At that visit beta blocker was not restarted due to bradycardia. He was started on atorvastatin and amlodipine/benazepril. He followed up with PCP 5/7/18 and has been taking his medications.    Insomnia  Patient was concerned with his difficulty sleeping since arriving here. Denies that pain or anxiety are an issue. He has never had trouble sleeping in the past, never taken anything for sleep. Melatonin was started.      PAST MEDICAL HISTORY:  has a past medical history of Osteoporosis.    DISCHARGE MEDICATIONS:  Current Outpatient Prescriptions   Medication Sig Dispense Refill     Acetaminophen (TYLENOL PO) Take 650 mg by mouth every 4 hours as needed for mild pain or fever       aspirin 81 MG tablet Take 1 tablet (81 mg) by mouth daily 90 tablet 6     atorvastatin (LIPITOR) 40 MG tablet Take 1 tablet (40 mg) by mouth daily 90 tablet 3     Benazepril HCl (LOTENSIN PO) Take 10 mg by mouth daily       calcium carbonate (TUMS) 500 MG chewable tablet Take 2 chew tab by mouth every 6 hours as needed for heartburn       melatonin 3 MG tablet Take 1 tablet (3 mg) by mouth At Bedtime       Ondansetron HCl (ZOFRAN PO) Take 4 mg by mouth every 6 hours as needed for nausea or vomiting         MEDICATION CHANGES/RATIONALE:   Melatonin started for insomnia  TUMs and Zofran started for nausea vs indigestion (patient called it nausea)    Controlled medications sent with patient:   not  applicable/none     ROS:    4 point ROS including Respiratory, CV, GI and , other than that noted in the HPI,  is negative    Physical Exam:   Vitals: /76  Pulse 66  Temp 98.5  F (36.9  C)  Resp 18  Wt 185 lb 11.2 oz (84.2 kg)  SpO2 95%  BMI 30.9 kg/m2  BMI= Body mass index is 30.9 kg/(m^2).  GENERAL APPEARANCE:  Alert, in no distress, oriented  ENT:  Mouth and posterior oropharynx normal, moist mucous membranes, [place]  EYES:  EOM, conjunctivae, lids, pupils and irises normal  NECK:  No adenopathy,masses or thyromegaly  RESP:  respiratory effort and palpation of chest normal, lungs clear to auscultation , no respiratory distress  CV:  Palpation and auscultation of heart done , regular rate and rhythm, no murmur, rub, or gallop, no edema  ABDOMEN:  normal bowel sounds, soft, nontender, no hepatosplenomegaly or other masses  SKIN:  Inspection of skin and subcutaneous tissue baseline, Palpation of skin and subcutaneous tissue baseline  PSYCH:  often answers questions incorrectly, it is difficult to tell if this is due to his severe hearing loss, cognitive impairment, low education, or all of the above. He is calm, follows commands      DISCHARGE PLAN:  LTC with hospice  Patient instructed to follow-up with:  LTC provider     [facility] scheduled appointments: None    MTM referral needed and placed by this provider: No    Pending labs: none    SNF labs     Labs per [facility]:    Recent Labs   05/24/18  0558 05/23/18  0909   WBC 6.6 6.3   RBC 4.15 L 4.37   HGB 12.7 L 13.2 L   HCT 39.7 42.0   MCV 96 96   MCH 30.6 30.2   MCHC 32.0 31.4 L    162   MPV 10.3 10.3     Recent Labs   05/24/18  0558 05/23/18  0909   SODIUM 137 138   POTASSIUM 4.2 4.1   CHLORIDE 105 104   RF2JCUSX 25 27   BUN 22 31 H   CREATININE 0.82 0.97   CALCIUM 9.1 9.5   GLUCOSE 94 98     Recent Labs   05/23/18  0909   ALBUMIN 3.7   BILITOTAL 0.7   BILIDIRECT 0.4   ALT 28   AST 29   ALKPHOSPH 91    PROTEIN 7.0         Last Basic Metabolic Panel:  Recent Labs   Lab Test  05/07/18   1130 01/22/18 05/30/15   02/11/13   1054   NA  139   --   138   < >  142   POTASSIUM  4.2  4.1  1.0   < >  4.2   CHLORIDE  104   --   105   < >  105   LAKISHA  9.3   --   9.2   < >  8.7   CO2  28   --    --    --   28   BUN  22   --   22   < >  17   CR  0.92  0.82  0.85   < >  1.10   GLC  93  92  175*   < >  96    < > = values in this interval not displayed.       Liver Function Studies -   Recent Labs   Lab Test  05/07/18   1130  02/11/13   1054   PROTTOTAL  7.6  7.4   ALBUMIN  3.8  3.9   BILITOTAL  0.8  0.8   ALKPHOS  81  104   AST  31  18   ALT  34  22       Discharge Treatments: none    TOTAL DISCHARGE TIME:   Greater than 30 minutes  Electronically signed by:  CAMILA Ward [facility]  Pager: [phone]

## 2018-06-07 NOTE — LETTER
6/7/2018        RE: Julito Cooper  [address]  [facility] [address]          [place] [facility] DISCHARGE SUMMARY    PATIENT'S NAME: Julito Coopre  YOB: 1935  MEDICAL RECORD NUMBER:  [id number]    PRIMARY CARE PROVIDER AND CLINIC RESPONSIBLE AFTER TRANSFER: Arturo Yeboah [address] / [facility] [address]     CODE STATUS/ADVANCE DIRECTIVES DISCUSSION:   CPR/Full code      No Known Allergies    TRANSFERRING PROVIDERS: CAMILA Ward CNP, Debora Ross MD  DATE OF SNF ADMISSION:  May / 26 / 2018  DATE OF SNF (anticipated) DISCHARGE: June / 07 / 2018  DISCHARGE DISPOSITION: [facility]    Nursing Facility: [facility]  stay 5/23/18 to 6/26/18.       DISCHARGE DIAGNOSIS:   1. Primary malignant neoplasm of lung metastatic to other site, unspecified laterality (H)    2. Essential hypertension    3. Coronary artery disease involving native coronary artery of native heart without angina pectoris    4. Insomnia, unspecified type        HPI Nursing Facility Course:  HPI information obtained from: facility chart records, facility staff and patient report. HPI is challenging with patient due to severe [place] and low education level vs developmental delay.     Patient had gone to the [facility] ED due to chest pain. He was ultimately diagnosed with lung cancer with liver mets. His niece is working on being his decision maker (she has the POA paperwork and information on notary from social work). Plan is to move both him and his wife (LTC here) to [facility] where patient's brother (niece's father) lives in LTC. They will all be on the same floor.    Primary malignant neoplasm of lung metastatic to other site, unspecified laterality (H)  Oncology note not available to this provider, but plan is for him to move into [facility] and enroll in hospice once there. He is very happy to be  living in the same place as his wife and brother.    Essential hypertension  Did not want to make any changes on his way out the door (notified today of his leaving). Based on BP measurements, his medications could be decreased/stopped.  BP readings range:  126/76  118/67  101/64  114/67  115/66  107/63  100/63  112/65  102/60    Coronary artery disease involving native coronary artery of native heart without angina pectoris  Patient had a not been to a doctor in a while until he saw his wife's previous PCP 1/2018 after a hospital stay. He had only been taking an ASA and had stopped his statin, plavix, and metoprolol at some point. At that visit beta blocker was not restarted due to bradycardia. He was started on atorvastatin and amlodipine/benazepril. He followed up with PCP 5/7/18 and has been taking his medications.    Insomnia  Patient was concerned with his difficulty sleeping since arriving here. Denies that pain or anxiety are an issue. He has never had trouble sleeping in the past, never taken anything for sleep. Melatonin was started.      PAST MEDICAL HISTORY:  has a past medical history of Osteoporosis.    DISCHARGE MEDICATIONS:  Current Outpatient Prescriptions   Medication Sig Dispense Refill     Acetaminophen (TYLENOL PO) Take 650 mg by mouth every 4 hours as needed for mild pain or fever       aspirin 81 MG tablet Take 1 tablet (81 mg) by mouth daily 90 tablet 6     atorvastatin (LIPITOR) 40 MG tablet Take 1 tablet (40 mg) by mouth daily 90 tablet 3     Benazepril HCl (LOTENSIN PO) Take 10 mg by mouth daily       calcium carbonate (TUMS) 500 MG chewable tablet Take 2 chew tab by mouth every 6 hours as needed for heartburn       melatonin 3 MG tablet Take 1 tablet (3 mg) by mouth At Bedtime       Ondansetron HCl (ZOFRAN PO) Take 4 mg by mouth every 6 hours as needed for nausea or vomiting         MEDICATION CHANGES/RATIONALE:   Melatonin started for insomnia  TUMs and Zofran started for nausea vs  indigestion (patient called it nausea)    Controlled medications sent with patient:   not applicable/none     ROS:    4 point ROS including Respiratory, CV, GI and , other than that noted in the HPI,  is negative    Physical Exam:   Vitals: /76  Pulse 66  Temp 98.5  F (36.9  C)  Resp 18  Wt 185 lb 11.2 oz (84.2 kg)  SpO2 95%  BMI 30.9 kg/m2  BMI= Body mass index is 30.9 kg/(m^2).  GENERAL APPEARANCE:  Alert, in no distress, oriented  ENT:  Mouth and posterior oropharynx normal, moist mucous membranes, [place]  EYES:  EOM, conjunctivae, lids, pupils and irises normal  NECK:  No adenopathy,masses or thyromegaly  RESP:  respiratory effort and palpation of chest normal, lungs clear to auscultation , no respiratory distress  CV:  Palpation and auscultation of heart done , regular rate and rhythm, no murmur, rub, or gallop, no edema  ABDOMEN:  normal bowel sounds, soft, nontender, no hepatosplenomegaly or other masses  SKIN:  Inspection of skin and subcutaneous tissue baseline, Palpation of skin and subcutaneous tissue baseline  PSYCH:  often answers questions incorrectly, it is difficult to tell if this is due to his severe hearing loss, cognitive impairment, low education, or all of the above. He is calm, follows commands      DISCHARGE PLAN:  LTC with hospice  Patient instructed to follow-up with:  LTC provider     [facility] scheduled appointments: None    MTM referral needed and placed by this provider: No    Pending labs: none    SNF labs     Labs per [facility]:    Recent Labs   05/24/18  0558 05/23/18  0909   WBC 6.6 6.3   RBC 4.15 L 4.37   HGB 12.7 L 13.2 L   HCT 39.7 42.0   MCV 96 96   MCH 30.6 30.2   MCHC 32.0 31.4 L    162   MPV 10.3 10.3     Recent Labs   05/24/18  0558 05/23/18  0909   SODIUM 137 138   POTASSIUM 4.2 4.1   CHLORIDE 105 104   [id number] 25 27   BUN 22 31 H   CREATININE 0.82 0.97   CALCIUM 9.1 9.5   GLUCOSE 94 98     Recent Labs    05/23/18  0909   ALBUMIN 3.7   BILITOTAL 0.7   BILIDIRECT 0.4   ALT 28   AST 29   ALKPHOSPH 91   PROTEIN 7.0         Last Basic Metabolic Panel:  Recent Labs   Lab Test  05/07/18   1130 01/22/18 05/30/15   02/11/13   1054   NA  139   --   138   < >  142   POTASSIUM  4.2  4.1  1.0   < >  4.2   CHLORIDE  104   --   105   < >  105   LAKISHA  9.3   --   9.2   < >  8.7   CO2  28   --    --    --   28   BUN  22   --   22   < >  17   CR  0.92  0.82  0.85   < >  1.10   GLC  93  92  175*   < >  96    < > = values in this interval not displayed.       Liver Function Studies -   Recent Labs   Lab Test  05/07/18   1130  02/11/13   1054   PROTTOTAL  7.6  7.4   ALBUMIN  3.8  3.9   BILITOTAL  0.8  0.8   ALKPHOS  81  104   AST  31  18   ALT  34  22       Discharge Treatments: none    TOTAL DISCHARGE TIME:   Greater than 30 minutes  Electronically signed by:  CAMILA Ward [facility]  Pager: [phone]

## 2018-06-11 PROBLEM — E78.5 HYPERLIPIDEMIA LDL GOAL <70: Status: RESOLVED | Noted: 2018-01-01 | Resolved: 2018-01-01

## 2018-06-11 NOTE — LETTER
"    6/11/2018        RE: Julito Cooper  [address]  [facility] [address]        Julito Cooper is a 83 year old male seen June 11, 2018 at [facility] where he was admitted last week after [facility] hospitalization for new dx of small cell lung cancer, seen for initial visit.     Patient is seen in his room, reports he's \"mary rough\"     C/o RUQ pain and nausea.    Wants to try eating some lunch and see if he feels any better.   Denies constipation or diarrhea.      Thinks these symptoms have been present just the past couple of days, but by chart review he presented to hospital in May with these symptoms.      He had not seen a doctor in some time, then during [facility]W hospitalization he was found to have lung primary with liver and brain mets.   (Brain imaging and CXR done in January were negative)   Liver biopsy done 5/25 revealed both smears and core biopsy were diagnostic of small cell carcinoma.   By niece report, some form of treatment was offered by Oncology, but patient is not interested, and he reiterates that today.       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     HTN  Hyperlipidemia  CAD, h/o NSTEMI    Past Surgical History:   Procedure Laterality Date     CHOLECYSTECTOMY  7/2010     HERNIA REPAIR  1990's     HERNIA REPAIR Left 8/14    incarcerated L inguinal; Dr. Robles     ORTHOPEDIC SURGERY  2007    Right TKA     TURP  2002     Social History   Substance Use Topics     Smoking status: Former Smoker     Types: Pipe     Smokeless tobacco: Never Used      Comment: Quit smoking a pipe 3 months ago.      Alcohol use No      SH:  Patient was previously living alone independently, house in [facility].   His wife was at [facility] since 4/2017, now moved to [facility] as well.    No children. " "   He has a brother who also lives here, and that brother's daughter is currently helping out.   Patient retired from work in a [employer], and at [employer] for many years.     FH:  Brother had Parkinson's   Father had kidney problems    Review Of Systems  Skin: negative   Eyes: impaired vision  Ears/Nose/Throat: hearing loss  Respiratory: as above  Cardiovascular: negative  Gastrointestinal: poor appetite and nausea  Genitourinary: negative  Musculoskeletal: generalized weakness, using WC     Neurologic: limited history     Psychiatric: negative  Hematologic/Lymphatic/Immunologic: negative  Endocrine: negative      GENERAL APPEARANCE: alert and no distress  /60  Pulse 78  Temp 98.4  F (36.9  C)  Resp 18  Ht 5' 7\" (1.702 m)  Wt 184 lb (83.5 kg)  SpO2 92%  BMI 28.82 kg/m2   HEENT: normocephalic, no lesion or abnormalities  NECK: no adenopathy, no asymmetry, masses, or scars and thyroid normal to palpation  RESP: decreased BS, scattered crackles, phlegmy cough  CV: regular rate and rhythm, normal S1 S2  ABDOMEN:  soft, no HSM or masses and bowel sounds normal, no rebound or guarding, some RUQ tenderness  MS: extremities normal- no gross deformities noted, no evidence of inflammation in joints, FROM in all extremities.  SKIN: no suspicious lesions or rashes  NEURO: Normal strength and tone, sensory exam grossly normal, and speech garbled, limited history.     PSYCH: affect okay  LYMPHATICS: No cervical,  or supraclavicular nodes     Last Basic Metabolic Panel:  Lab Results   Component Value Date     05/07/2018      Lab Results   Component Value Date    POTASSIUM 4.2 05/07/2018     Lab Results   Component Value Date    CHLORIDE 104 05/07/2018     Lab Results   Component Value Date    LAKISHA 9.3 05/07/2018     Lab Results   Component Value Date    CO2 28 05/07/2018     Lab Results   Component Value Date    BUN 22 05/07/2018     Lab Results   Component Value Date    CR 0.92 05/07/2018   GFR >90  Lab Results "   Component Value Date    GLC 93 05/07/2018     Lab Results   Component Value Date    AST 31 05/07/2018     Lab Results   Component Value Date    ALT 34 05/07/2018     Lab Results   Component Value Date    BILITOTAL 0.8 05/07/2018     Lab Results   Component Value Date    ALBUMIN 3.8 05/07/2018     Lab Results   Component Value Date    PROTTOTAL 7.6 05/07/2018      Lab Results   Component Value Date    ALKPHOS 81 05/07/2018          MRI brain 5/25/18  IMPRESSION:    1. Motion artifact and noncontrast technique significantly limit evaluation.    2. Small T2 FLAIR hyperintense lesions in the left frontal operculum and anterolateral left cerebellar hemisphere are associated with subtle peripheral diffusion weighted signal abnormality and are new compared to the MRI dated 01/22/2018. These findings are highly concerning for intracranial metastatic disease.    3. Signal abnormality within the right lateral mass of C1 is new compared to the prior exam and concerning for osseous metastatic disease.     4. No acute infarction.    5. Moderate chronic microvascular ischemic disease.    6. Moderate diffuse cerebral volume loss.      IMP/PLAN:    (C34.91) Small cell carcinoma of right lung (H)  (primary encounter diagnosis)  (C78.7) Secondary malignant neoplasm of liver (H)  Comment: liver and brain mets     Plan: Patient does not want to pursue any treatment and is looking to comfort focus.   I talked with his niece by phone who is now patient's POA.   Questions answered about dx and likely prognosis.   Goal is to get patient more comfortable, and reviewed Hospice care to help with this.   Niece is in agreement, so will ask for Hospice eval.        (R11.0) Nausea  Comment: and RUQ discomfort likely related to liver mets.     Plan: schedule Zofran, have prn Norco available for any pain       (I10) Hypertension goal BP (blood pressure) < 140/80  Comment: bps lower since admit   Plan: d/c benazepril       (I25.10) Coronary artery  disease involving native coronary artery of native heart without angina pectoris  Comment: no current symptoms     Plan: given comfort focus, will d/c atrovastatin.    Will continue daily ASA for now.        Medina Stallings MD       Sincerely,        Medina Stallings MD

## 2018-06-11 NOTE — PROGRESS NOTES
"Julito Cooper is a 83 year old male seen June 11, 2018 at [facility] where he was admitted last week after [facility] hospitalization for new dx of small cell lung cancer, seen for initial visit.     Patient is seen in his room, reports he's \"mary rough\"     C/o RUQ pain and nausea.    Wants to try eating some lunch and see if he feels any better.   Denies constipation or diarrhea.      Thinks these symptoms have been present just the past couple of days, but by chart review he presented to hospital in May with these symptoms.      He had not seen a doctor in some time, then during [facility] hospitalization he was found to have lung primary with liver and brain mets.   (Brain imaging and CXR done in January were negative)   Liver biopsy done 5/25 revealed both smears and core biopsy were diagnostic of small cell carcinoma.   By niece report, some form of treatment was offered by Oncology, but patient is not interested, and he reiterates that today.       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     HTN  Hyperlipidemia  CAD, h/o NSTEMI    Past Surgical History:   Procedure Laterality Date     CHOLECYSTECTOMY  7/2010     HERNIA REPAIR  1990's     HERNIA REPAIR Left 8/14    incarcerated L inguinal; Dr. Robles     ORTHOPEDIC SURGERY  2007    Right TKA     TURP  2002     Social History   Substance Use Topics     Smoking status: Former Smoker     Types: Pipe     Smokeless tobacco: Never Used      Comment: Quit smoking a pipe 3 months ago.      Alcohol use No      SH:  Patient was previously living alone independently, house in [facility].   His wife was at [facility] since 4/2017, now moved to [facility] as well.    No children.    He has a brother who also lives here, and that brother's daughter is currently helping out. " "  Patient retired from work in a [employer], and at [employer] for many years.     FH:  Brother had Parkinson's   Father had kidney problems    Review Of Systems  Skin: negative   Eyes: impaired vision  Ears/Nose/Throat: hearing loss  Respiratory: as above  Cardiovascular: negative  Gastrointestinal: poor appetite and nausea  Genitourinary: negative  Musculoskeletal: generalized weakness, using WC     Neurologic: limited history     Psychiatric: negative  Hematologic/Lymphatic/Immunologic: negative  Endocrine: negative      GENERAL APPEARANCE: alert and no distress  /60  Pulse 78  Temp 98.4  F (36.9  C)  Resp 18  Ht 5' 7\" (1.702 m)  Wt 184 lb (83.5 kg)  SpO2 92%  BMI 28.82 kg/m2   HEENT: normocephalic, no lesion or abnormalities  NECK: no adenopathy, no asymmetry, masses, or scars and thyroid normal to palpation  RESP: decreased BS, scattered crackles, phlegmy cough  CV: regular rate and rhythm, normal S1 S2  ABDOMEN:  soft, no HSM or masses and bowel sounds normal, no rebound or guarding, some RUQ tenderness  MS: extremities normal- no gross deformities noted, no evidence of inflammation in joints, FROM in all extremities.  SKIN: no suspicious lesions or rashes  NEURO: Normal strength and tone, sensory exam grossly normal, and speech garbled, limited history.     PSYCH: affect okay  LYMPHATICS: No cervical,  or supraclavicular nodes     Last Basic Metabolic Panel:  Lab Results   Component Value Date     05/07/2018      Lab Results   Component Value Date    POTASSIUM 4.2 05/07/2018     Lab Results   Component Value Date    CHLORIDE 104 05/07/2018     Lab Results   Component Value Date    LAKISHA 9.3 05/07/2018     Lab Results   Component Value Date    CO2 28 05/07/2018     Lab Results   Component Value Date    BUN 22 05/07/2018     Lab Results   Component Value Date    CR 0.92 05/07/2018   GFR >90  Lab Results   Component Value Date    GLC 93 05/07/2018     Lab Results   Component Value Date    AST 31 " 05/07/2018     Lab Results   Component Value Date    ALT 34 05/07/2018     Lab Results   Component Value Date    BILITOTAL 0.8 05/07/2018     Lab Results   Component Value Date    ALBUMIN 3.8 05/07/2018     Lab Results   Component Value Date    PROTTOTAL 7.6 05/07/2018      Lab Results   Component Value Date    ALKPHOS 81 05/07/2018          MRI brain 5/25/18  IMPRESSION:    1. Motion artifact and noncontrast technique significantly limit evaluation.    2. Small T2 FLAIR hyperintense lesions in the left frontal operculum and anterolateral left cerebellar hemisphere are associated with subtle peripheral diffusion weighted signal abnormality and are new compared to the MRI dated 01/22/2018. These findings are highly concerning for intracranial metastatic disease.    3. Signal abnormality within the right lateral mass of C1 is new compared to the prior exam and concerning for osseous metastatic disease.     4. No acute infarction.    5. Moderate chronic microvascular ischemic disease.    6. Moderate diffuse cerebral volume loss.      IMP/PLAN:    (C34.91) Small cell carcinoma of right lung (H)  (primary encounter diagnosis)  (C78.7) Secondary malignant neoplasm of liver (H)  Comment: liver and brain mets     Plan: Patient does not want to pursue any treatment and is looking to comfort focus.   I talked with his niece by phone who is now patient's POA.   Questions answered about dx and likely prognosis.   Goal is to get patient more comfortable, and reviewed Hospice care to help with this.   Niece is in agreement, so will ask for Hospice eval.        (R11.0) Nausea  Comment: and RUQ discomfort likely related to liver mets.     Plan: schedule Zofran, have prn Norco available for any pain       (I10) Hypertension goal BP (blood pressure) < 140/80  Comment: bps lower since admit   Plan: d/c benazepril       (I25.10) Coronary artery disease involving native coronary artery of native heart without angina pectoris  Comment:  no current symptoms     Plan: given comfort focus, will d/c atrovastatin.    Will continue daily ASA for now.        Medina Stallings MD

## 2018-07-02 NOTE — PROGRESS NOTES
[facility]    Chief Complaint   Patient presents with     long-term Regulatory       [facility] Medical Record Number:  [id number]    HPI:    Julito Cooper is a 83 year old  (1935), who is being seen today for a federally mandated E/M visit at [facility].  HPI information obtained from: facility chart records, facility staff, patient report and [facility] Epic chart review. Today's concerns are:    Small cell carcinoma of right lung (H)  Secondary malignant neoplasm of liver (H)  Nausea  Hospice  Newly diagnosed small cell lung cancer with mets to Liver and brain.  Patient elected to decline treatment offered by Oncology and pursue comfort care and hospice.  He was admitted to [facility] in the nursing home and they have been following his care.  He continues to loose weight    His biggest concern continues to nausea and RUQ stomach pain.  He is currently on Zofran 4mg po Q8hrs with prn dose, which has not been used.  He is chronically nauseous, has not been eating.  He states Zofran helps, but doesn't last long enough.  No reports of emesis, he has high amount of phlegm that he is spitting up and with prn nebs.  He feels the nebs and spitting up the phlegm also improves his nausea.         Wt Readings from Last 5 Encounters:   07/02/18 178 lb (80.7 kg)   06/11/18 184 lb (83.5 kg)   06/07/18 185 lb 11.2 oz (84.2 kg)   06/04/18 184 lb 11.2 oz (83.8 kg)   05/31/18 184 lb 11.2 oz (83.8 kg)        Cough  As above, high amounts of phlegm, nebs help him cough it up.  He has productive cough, but denies/no reports of shortness of breath, his vitals are stable, afebrile and sating well on room air.     Drug-induced constipation  As noted above hs has not been eating, but staff report no BM in 1 week.  He has not been given any prn meds for this and is on senna s 2 tabs po QD.  Chronically nauseous and with RUQ pain that has remained unchanged.  He has  sluggish BS and his stomach is not distended, pain only to RUQ which has been consistent since his admission.  He is on zofran and is very sedentary, also likely not hydrated     Hypertension goal BP (blood pressure) < 140/80  Per history, no longer on antihypertensives.  Denies/no reports of chest pain, palpitations, dizziness or lightheadedness.    Last 3 BP's: 128/71, 134/74, 134/67  Pulse Readings from Last 4 Encounters:   07/02/18 76   06/11/18 78   06/07/18 66   06/04/18 73       Coronary artery disease involving native coronary artery of native heart without angina pectoris  Per history, no longer on statin due to age and goals of care.  On 81mg asa, no reports of chest pain or palpitations     Insomnia, unspecified type  Per history, no recent issues reported, does not like taking oral medications due to nausea. Has scheduled melatonin          ALLERGIES: Review of patient's allergies indicates no known allergies.  PAST MEDICAL HISTORY:  has a past medical history of Osteoporosis.  PAST SURGICAL HISTORY:  has a past surgical history that includes hernia repair (1990's); turp (2002); Cholecystectomy (7/2010); orthopedic surgery (2007); and hernia repair (Left, 8/14).  FAMILY HISTORY: family history is not on file.  SOCIAL HISTORY:  reports that he has quit smoking. His smoking use included Pipe. He has never used smokeless tobacco. He reports that he does not drink alcohol or use illicit drugs.    MEDICATIONS:  Current Outpatient Prescriptions   Medication Sig Dispense Refill     Acetaminophen (TYLENOL PO) Take 650 mg by mouth every 4 hours as needed for mild pain or fever       albuterol (2.5 MG/3ML) 0.083% neb solution Take 2.5 mg by nebulization every 4 hours as needed for shortness of breath / dyspnea or wheezing       aspirin 81 MG tablet Take 1 tablet (81 mg) by mouth daily 90 tablet 6     calcium carbonate (TUMS) 500 MG chewable tablet Take 2 chew tab by mouth every 6 hours as needed for heartburn    "    melatonin 3 MG tablet Take 1 tablet (3 mg) by mouth At Bedtime       Ondansetron HCl (ZOFRAN PO) Take 4 mg by mouth every 8 hours AND 4 MG DAILY PRN       polyethylene glycol (MIRALAX/GLYCOLAX) Packet Take 17 g by mouth daily as needed for constipation       senna-docusate (SENOKOT-S;PERICOLACE) 8.6-50 MG per tablet Take 2 tablets by mouth daily       Medications reviewed:  Medications reconciled to facility chart and changes were made to reflect current medications as identified as above med list. Below are the changes that were made:   Medications stopped since last EPIC medication reconciliation:   There are no discontinued medications.    Medications started since last [facility] medication reconciliation:  Orders Placed This Encounter   Medications     albuterol (2.5 MG/3ML) 0.083% neb solution     Sig: Take 2.5 mg by nebulization every 4 hours as needed for shortness of breath / dyspnea or wheezing     senna-docusate (SENOKOT-S;PERICOLACE) 8.6-50 MG per tablet     Sig: Take 2 tablets by mouth daily     polyethylene glycol (MIRALAX/GLYCOLAX) Packet     Sig: Take 17 g by mouth daily as needed for constipation         Case Management:  I have reviewed the care plan and MDS and do agree with the plan. Patient's desire to return to the community is not present.  Information reviewed:  Medications, vital signs, orders, and nursing notes.    ROS:  10 point ROS of systems including Constitutional, Eyes, Respiratory, Cardiovascular, Gastroenterology, Genitourinary, Integumentary, Muscularskeletal, Psychiatric were all negative except for pertinent positives noted in my HPI.    Exam:  Vitals: /71  Pulse 76  Temp 97.9  F (36.6  C)  Resp 15  Ht 5' 6\" (1.676 m)  Wt 178 lb (80.7 kg)  SpO2 90%  BMI 28.73 kg/m2  BMI= Body mass index is 28.73 kg/(m^2).  GENERAL APPEARANCE:  Alert, appears ill, in mild distress due to nausea, cooperative  NECK:  No adenopathy,masses or thyromegaly  RESP:  respiratory effort and " palpation of chest normal, lungs clear to auscultation , no respiratory distress, productive cough  CV:  Palpation and auscultation of heart done , regular rate and rhythm, no murmur, rub, or gallop, no edema  ABDOMEN:  sluggish bowel sounds, RUQ tender to palpation, non-distended, no palpable masses   M/S:   Gait and station abnormal generalized weakness, w/c bound, no edema/erythema joints  SKIN:  no rashes or wounds noted to anterior side  NEURO:   no tremor, no facial asymmetry, sensory exam grossly normal  PSYCH:  insight and judgement impaired, memory impaired     Lab/Diagnostic data: .    Last Basic Metabolic Panel:  Recent Labs   Lab Test 05/24/18 05/07/18   1130  05/30/15   02/11/13   1054   NA   --   139   --   138   < >  142   POTASSIUM  4.2  4.2   < >  1.0   < >  4.2   CHLORIDE   --   104   --   105   < >  105   LAKISHA   --   9.3   --   9.2   < >  8.7   CO2   --   28   --    --    --   28   BUN   --   22   --   22   < >  17   CR  0.82  0.92   < >  0.85   < >  1.10   GLC  94  93   < >  175*   < >  96    < > = values in this interval not displayed.       Liver Function Studies -   Recent Labs   Lab Test 05/23/18 05/07/18   1130  02/11/13   1054   PROTTOTAL   --   7.6  7.4   ALBUMIN   --   3.8  3.9   BILITOTAL   --   0.8  0.8   ALKPHOS   --   81  104   AST  29  31  18   ALT  28  34  22         ASSESSMENT/PLAN  (C34.91) Small cell carcinoma of right lung (H)  (primary encounter diagnosis)  (C78.7) Secondary malignant neoplasm of liver (H)  Comment: recently diagnosed with liver and brain mets, desires comfort care  Plan: hospice    (R11.0) Nausea  Comment: continues to be present  Plan: change zofran to 4mg po QID given while patient awake  -hospice to follow if not effective     (R05) Cough  Comment: contributes to phlegm and nausea per patient, feels nebs helpful  Plan: schedule albuterol nebs TID continue with prns    (K59.03) Drug-induced constipation  Comment: per staff no BM in 1 week, BS sluggish, no  abdominal distention, patient not eating  Plan: patient needs BM today  -staff to give suppository stat  -d'c senna s  -miralex 17 G po QD  -prn fleets and bisacodyl suppository   -staff need to monitor BM's and given prns if no BM in 2-3 days     (I10) Hypertension goal BP (blood pressure) < 140/80  Comment: per history, well controlled without pharmacological treatment   Plan: monitor BP's occasionally, goal of care comfort    (I25.10) Coronary artery disease involving native coronary artery of native heart without angina pectoris  Comment: per history, asymptomatic  Plan: daily asa for now, d'c in future if nausea continues     (G47.00) Insomnia, unspecified type  Comment: no longer reported  Plan: change melatonin to prn     (Z51.5) Hospice care patient  Comment: involved  Plan: continue care and support       Electronically signed by:  CAMILA Cabral CNP

## 2018-07-02 NOTE — LETTER
7/2/2018        RE: Julito Cooper  [facility]  [address] [address] [address]          [facility]    Chief Complaint   Patient presents with     [facility] Regulatory       [place] [facility] Medical Record Number:  [id number]    HPI:    Julito Cooper is a 83 year old  (1935), who is being seen today for a federally mandated E/M visit at [facility].  HPI information obtained from: facility chart records, facility staff, patient report and [facility] chart review. Today's concerns are:    Small cell carcinoma of right lung (H)  Secondary malignant neoplasm of liver (H)  Nausea  Hospice  Newly diagnosed small cell lung cancer with mets to Liver and brain.  Patient elected to decline treatment offered by Oncology and pursue comfort care and hospice.  He was admitted to [facility] in the nursing home and they have been following his care.  He continues to loose weight    His biggest concern continues to nausea and RUQ stomach pain.  He is currently on Zofran 4mg po Q8hrs with prn dose, which has not been used.  He is chronically nauseous, has not been eating.  He states Zofran helps, but doesn't last long enough.  No reports of emesis, he has high amount of phlegm that he is spitting up and with prn nebs.  He feels the nebs and spitting up the phlegm also improves his nausea.         Wt Readings from Last 5 Encounters:   07/02/18 178 lb (80.7 kg)   06/11/18 184 lb (83.5 kg)   06/07/18 185 lb 11.2 oz (84.2 kg)   06/04/18 184 lb 11.2 oz (83.8 kg)   05/31/18 184 lb 11.2 oz (83.8 kg)        Cough  As above, high amounts of phlegm, nebs help him cough it up.  He has productive cough, but denies/no reports of shortness of breath, his vitals are stable, afebrile and sating well on room air.     Drug-induced constipation  As noted above hs has not been eating, but staff report no BM in 1 week.  He has not been given any prn meds for  this and is on senna s 2 tabs po QD.  Chronically nauseous and with RUQ pain that has remained unchanged.  He has sluggish BS and his stomach is not distended, pain only to RUQ which has been consistent since his admission.  He is on zofran and is very sedentary, also likely not hydrated     Hypertension goal BP (blood pressure) < 140/80  Per history, no longer on antihypertensives.  Denies/no reports of chest pain, palpitations, dizziness or lightheadedness.    Last 3 BP's: 128/71, 134/74, 134/67  Pulse Readings from Last 4 Encounters:   07/02/18 76   06/11/18 78   06/07/18 66   06/04/18 73       Coronary artery disease involving native coronary artery of native heart without angina pectoris  Per history, no longer on statin due to age and goals of care.  On 81mg asa, no reports of chest pain or palpitations     Insomnia, unspecified type  Per history, no recent issues reported, does not like taking oral medications due to nausea. Has scheduled melatonin          ALLERGIES: Review of patient's allergies indicates no known allergies.  PAST MEDICAL HISTORY:  has a past medical history of Osteoporosis.  PAST SURGICAL HISTORY:  has a past surgical history that includes hernia repair (1990's); turp (2002); Cholecystectomy (7/2010); orthopedic surgery (2007); and hernia repair (Left, 8/14).  FAMILY HISTORY: family history is not on file.  SOCIAL HISTORY:  reports that he has quit smoking. His smoking use included Pipe. He has never used smokeless tobacco. He reports that he does not drink alcohol or use illicit drugs.    MEDICATIONS:  Current Outpatient Prescriptions   Medication Sig Dispense Refill     Acetaminophen (TYLENOL PO) Take 650 mg by mouth every 4 hours as needed for mild pain or fever       albuterol (2.5 MG/3ML) 0.083% neb solution Take 2.5 mg by nebulization every 4 hours as needed for shortness of breath / dyspnea or wheezing       aspirin 81 MG tablet Take 1 tablet (81 mg) by mouth daily 90 tablet 6      "calcium carbonate (TUMS) 500 MG chewable tablet Take 2 chew tab by mouth every 6 hours as needed for heartburn       melatonin 3 MG tablet Take 1 tablet (3 mg) by mouth At Bedtime       Ondansetron HCl (ZOFRAN PO) Take 4 mg by mouth every 8 hours AND 4 MG DAILY PRN       polyethylene glycol (MIRALAX/GLYCOLAX) Packet Take 17 g by mouth daily as needed for constipation       senna-docusate (SENOKOT-S;PERICOLACE) 8.6-50 MG per tablet Take 2 tablets by mouth daily       Medications reviewed:  Medications reconciled to facility chart and changes were made to reflect current medications as identified as above med list. Below are the changes that were made:   Medications stopped since last EPIC medication reconciliation:   There are no discontinued medications.    Medications started since last [facility] medication reconciliation:  Orders Placed This Encounter   Medications     albuterol (2.5 MG/3ML) 0.083% neb solution     Sig: Take 2.5 mg by nebulization every 4 hours as needed for shortness of breath / dyspnea or wheezing     senna-docusate (SENOKOT-S;PERICOLACE) 8.6-50 MG per tablet     Sig: Take 2 tablets by mouth daily     polyethylene glycol (MIRALAX/GLYCOLAX) Packet     Sig: Take 17 g by mouth daily as needed for constipation         Case Management:  I have reviewed the care plan and MDS and do agree with the plan. Patient's desire to return to the community is not present.  Information reviewed:  Medications, vital signs, orders, and nursing notes.    ROS:  10 point ROS of systems including Constitutional, Eyes, Respiratory, Cardiovascular, Gastroenterology, Genitourinary, Integumentary, Muscularskeletal, Psychiatric were all negative except for pertinent positives noted in my HPI.    Exam:  Vitals: /71  Pulse 76  Temp 97.9  F (36.6  C)  Resp 15  Ht 5' 6\" (1.676 m)  Wt 178 lb (80.7 kg)  SpO2 90%  BMI 28.73 kg/m2  BMI= Body mass index is 28.73 kg/(m^2).  GENERAL APPEARANCE:  Alert, appears ill, in mild " distress due to nausea, cooperative  NECK:  No adenopathy,masses or thyromegaly  RESP:  respiratory effort and palpation of chest normal, lungs clear to auscultation , no respiratory distress, productive cough  CV:  Palpation and auscultation of heart done , regular rate and rhythm, no murmur, rub, or gallop, no edema  ABDOMEN:  sluggish bowel sounds, RUQ tender to palpation, non-distended, no palpable masses   M/S:   Gait and station abnormal generalized weakness, w/c bound, no edema/erythema joints  SKIN:  no rashes or wounds noted to anterior side  NEURO:   no tremor, no facial asymmetry, sensory exam grossly normal  PSYCH:  insight and judgement impaired, memory impaired     Lab/Diagnostic data: .    Last Basic Metabolic Panel:  Recent Labs   Lab Test 05/24/18 05/07/18   1130  05/30/15   02/11/13   1054   NA   --   139   --   138   < >  142   POTASSIUM  4.2  4.2   < >  1.0   < >  4.2   CHLORIDE   --   104   --   105   < >  105   LAKISHA   --   9.3   --   9.2   < >  8.7   CO2   --   28   --    --    --   28   BUN   --   22   --   22   < >  17   CR  0.82  0.92   < >  0.85   < >  1.10   GLC  94  93   < >  175*   < >  96    < > = values in this interval not displayed.       Liver Function Studies -   Recent Labs   Lab Test 05/23/18 05/07/18   1130  02/11/13   1054   PROTTOTAL   --   7.6  7.4   ALBUMIN   --   3.8  3.9   BILITOTAL   --   0.8  0.8   ALKPHOS   --   81  104   AST  29  31  18   ALT  28  34  22         ASSESSMENT/PLAN  (C34.91) Small cell carcinoma of right lung (H)  (primary encounter diagnosis)  (C78.7) Secondary malignant neoplasm of liver (H)  Comment: recently diagnosed with liver and brain mets, desires comfort care  Plan: hospice    (R11.0) Nausea  Comment: continues to be present  Plan: change zofran to 4mg po QID given while patient awake  -hospice to follow if not effective     (R05) Cough  Comment: contributes to phlegm and nausea per patient, feels nebs helpful  Plan: schedule albuterol nebs TID  continue with prns    (K59.03) Drug-induced constipation  Comment: per staff no BM in 1 week, BS sluggish, no abdominal distention, patient not eating  Plan: patient needs BM today  -staff to give suppository stat  -d'c senna s  -miralex 17 G po QD  -prn fleets and bisacodyl suppository   -staff need to monitor BM's and given prns if no BM in 2-3 days     (I10) Hypertension goal BP (blood pressure) < 140/80  Comment: per history, well controlled without pharmacological treatment   Plan: monitor BP's occasionally, goal of care comfort    (I25.10) Coronary artery disease involving native coronary artery of native heart without angina pectoris  Comment: per history, asymptomatic  Plan: daily asa for now, d'c in future if nausea continues     (G47.00) Insomnia, unspecified type  Comment: no longer reported  Plan: change melatonin to prn     (Z51.5) Hospice care patient  Comment: involved  Plan: continue care and support       Electronically signed by:  CAMILA Cabral CNP        Sincerely,        CAMILA Cabral CNP